# Patient Record
Sex: FEMALE | Race: WHITE | Employment: FULL TIME | URBAN - METROPOLITAN AREA
[De-identification: names, ages, dates, MRNs, and addresses within clinical notes are randomized per-mention and may not be internally consistent; named-entity substitution may affect disease eponyms.]

---

## 2023-03-03 ENCOUNTER — OFFICE VISIT (OUTPATIENT)
Dept: PRIMARY CARE CLINIC | Age: 28
End: 2023-03-03
Payer: COMMERCIAL

## 2023-03-03 VITALS
HEIGHT: 67 IN | TEMPERATURE: 97.7 F | SYSTOLIC BLOOD PRESSURE: 111 MMHG | BODY MASS INDEX: 23.1 KG/M2 | WEIGHT: 147.2 LBS | DIASTOLIC BLOOD PRESSURE: 72 MMHG | HEART RATE: 80 BPM | RESPIRATION RATE: 16 BRPM | OXYGEN SATURATION: 99 %

## 2023-03-03 DIAGNOSIS — J01.90 ACUTE BACTERIAL RHINOSINUSITIS: Primary | ICD-10-CM

## 2023-03-03 DIAGNOSIS — B96.89 ACUTE BACTERIAL RHINOSINUSITIS: Primary | ICD-10-CM

## 2023-03-03 PROCEDURE — 99213 OFFICE O/P EST LOW 20 MIN: CPT | Performed by: NURSE PRACTITIONER

## 2023-03-03 RX ORDER — AMOXICILLIN AND CLAVULANATE POTASSIUM 875; 125 MG/1; MG/1
1 TABLET, FILM COATED ORAL 2 TIMES DAILY
Qty: 20 TABLET | Refills: 0 | Status: SHIPPED | OUTPATIENT
Start: 2023-03-03 | End: 2023-03-13

## 2023-03-03 NOTE — PROGRESS NOTES
Identified pt with two pt identifiers(name and ). Reviewed record in preparation for visit and have obtained necessary documentation. Chief Complaint   Patient presents with    Sinus Pain     X2-3 days; headache/congestion/ear fullness/ear soreness/cough; home covid this morning - neg; taking ibu      Vitals:    23 1539   BP: 111/72   Pulse: 80   Resp: 16   Temp: 97.7 °F (36.5 °C)   TempSrc: Temporal   SpO2: 99%   Weight: 147 lb 3.2 oz (66.8 kg)   Height: 5' 7\" (1.702 m)   PainSc:   3   PainLoc: Head       Health Maintenance Review: Patient reminded of \"due or due soon\" health maintenance. I have asked the patient to contact his/her primary care provider (PCP) for follow-up on his/her health maintenance. Coordination of Care Questionnaire:  :   1) Have you been to an emergency room, urgent care, or hospitalized since your last visit? If yes, where when, and reason for visit? no       2. Have seen or consulted any other health care provider since your last visit? If yes, where when, and reason for visit? NO      Patient is accompanied by self I have received verbal consent from Jenn Guzman to discuss any/all medical information while they are present in the room.

## 2023-03-03 NOTE — PROGRESS NOTES
Chief Complaint   Patient presents with    Sinus Pain     X2-3 days; headache/congestion/ear fullness/ear soreness/cough; home covid this morning - neg; taking ibu     HISTORY OF PRESENT ILLNESS  Jonelle Cardoza is a 33 y.o. female. Dx with sinus infection two weeks ago and placed on amoxicillin and medrol pack, finished few days ago. She reports initially felt better but started again with nasal congestion and cough. Cough worse at night. Associated headache and fatigue. She denies fever. No known allergies. She reports negative home covid test today. Review of Systems   Constitutional:  Positive for malaise/fatigue. HENT:  Positive for congestion and sinus pain. Respiratory:  Positive for cough. Negative for shortness of breath and wheezing. Cardiovascular: Negative. Gastrointestinal: Negative. Physical Exam  Vitals and nursing note reviewed. HENT:      Right Ear: Tympanic membrane normal.      Left Ear: Tympanic membrane normal.      Nose: Congestion present. Mouth/Throat:      Pharynx: Oropharynx is clear. No oropharyngeal exudate or posterior oropharyngeal erythema. Comments: No localized LAD  Cardiovascular:      Rate and Rhythm: Normal rate. Pulmonary:      Effort: Pulmonary effort is normal.      Breath sounds: Normal breath sounds. Musculoskeletal:      Cervical back: Neck supple. Neurological:      Mental Status: She is alert. Past Medical History:   Diagnosis Date    Connective tissue disease ([address]     Depression      History reviewed. No pertinent surgical history. /72 (BP 1 Location: Left arm, BP Patient Position: Sitting)   Pulse 80   Temp 97.7 °F (36.5 °C) (Temporal)   Resp 16   Ht 5' 7\" (1.702 m)   Wt 147 lb 3.2 oz (66.8 kg)   SpO2 99%   BMI 23.05 kg/m²    ASSESSMENT and PLAN  1. Acute bacterial rhinosinusitis  -     amoxicillin-clavulanate (AUGMENTIN) 875-125 mg per tablet; Take 1 Tablet by mouth two (2) times a day for 10 days. , Normal, Disp-20 Tablet, R-0  Will cover for ABRS. Discussed with patient may be related to environmental allergies. OTC treatments include 24 hour antihistamine (such as Zyrtec, Claritin, or Xyzal) and steroid nasal spray (such as Flonase or Nasacort). At home, be aware of allergy exposures.  Follow up as needed    FITO Zeng

## 2023-03-03 NOTE — PATIENT INSTRUCTIONS
OTC treatments include 24 hour antihistamine (such as Zyrtec, Claritin, or Xyzal) and steroid nasal spray (such as Flonase or Nasacort). At home, be aware of allergy exposures.

## 2024-03-07 ENCOUNTER — NURSE TRIAGE (OUTPATIENT)
Dept: OTHER | Facility: CLINIC | Age: 29
End: 2024-03-07
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
## 2024-03-28 ENCOUNTER — HOSPITAL ENCOUNTER (OUTPATIENT)
Facility: HOSPITAL | Age: 29
Setting detail: RECURRING SERIES
Discharge: HOME | End: 2024-03-31
Payer: COMMERCIAL

## 2024-03-28 PROCEDURE — 97535 SELF CARE MNGMENT TRAINING: CPT

## 2024-03-28 PROCEDURE — 97110 THERAPEUTIC EXERCISES: CPT

## 2024-03-28 PROCEDURE — 97161 PT EVAL LOW COMPLEX 20 MIN: CPT
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
## 2024-04-04 ENCOUNTER — HOSPITAL ENCOUNTER (OUTPATIENT)
Facility: HOSPITAL | Age: 29
Setting detail: RECURRING SERIES
Discharge: HOME | End: 2024-04-07
Payer: COMMERCIAL

## 2024-04-04 PROCEDURE — 97110 THERAPEUTIC EXERCISES: CPT

## 2024-04-09 ENCOUNTER — HOSPITAL ENCOUNTER (OUTPATIENT)
Facility: HOSPITAL | Age: 29
Setting detail: RECURRING SERIES
Discharge: HOME | End: 2024-04-12
Payer: COMMERCIAL

## 2024-04-09 PROCEDURE — 97110 THERAPEUTIC EXERCISES: CPT
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
## 2024-04-11 ENCOUNTER — APPOINTMENT (OUTPATIENT)
Facility: HOSPITAL | Age: 29
End: 2024-04-11
Payer: COMMERCIAL

## 2024-04-16 ENCOUNTER — APPOINTMENT (OUTPATIENT)
Facility: HOSPITAL | Age: 29
End: 2024-04-16
Payer: COMMERCIAL

## 2024-04-18 ENCOUNTER — HOSPITAL ENCOUNTER (OUTPATIENT)
Facility: HOSPITAL | Age: 29
Setting detail: RECURRING SERIES
Discharge: HOME | End: 2024-04-21
Payer: COMMERCIAL

## 2024-04-18 PROCEDURE — 97110 THERAPEUTIC EXERCISES: CPT

## 2024-04-18 NOTE — PROGRESS NOTES
focus on some thoracic mobility and shoulder strength.  She had difficulty maintaining CKC positions for thoracic mobility activity in quadruped.  She did fatigue by the end of the session but she is able to complete all there-ex.  Will Continue for 2-3 sessions to ensure that she is back to confidently performing all work duties.  Patient will continue to benefit from skilled PT / OT services to modify and progress therapeutic interventions, analyze and address functional mobility deficits, analyze and address ROM deficits, analyze and address strength deficits, analyze and address soft tissue restrictions, and analyze and modify for postural abnormalities to address functional deficits and attain remaining goals.    Progress toward goals / Updated goals:  []  See Progress Note/Recertification    Progressing toward goals      PLAN  Yes  Continue plan of care  Re-Cert Due: NA  [x]  Upgrade activities as tolerated  []  Discharge due to:  []  Other:      Kings Khan, PT       4/18/2024       11:06 AM

## 2024-04-19 LAB
CHOLEST SERPL-MCNC: 166 MG/DL
GLUCOSE SERPL-MCNC: 88 MG/DL (ref 65–100)
HDLC SERPL-MCNC: 64 MG/DL
LDLC SERPL CALC-MCNC: 94 MG/DL (ref 0–100)
TRIGL SERPL-MCNC: 40 MG/DL

## 2024-04-23 ENCOUNTER — APPOINTMENT (OUTPATIENT)
Facility: HOSPITAL | Age: 29
End: 2024-04-23
Payer: COMMERCIAL

## 2024-04-25 ENCOUNTER — HOSPITAL ENCOUNTER (OUTPATIENT)
Facility: HOSPITAL | Age: 29
Setting detail: RECURRING SERIES
Discharge: HOME | End: 2024-04-28
Payer: COMMERCIAL

## 2024-04-25 ENCOUNTER — APPOINTMENT (OUTPATIENT)
Facility: HOSPITAL | Age: 29
End: 2024-04-25
Payer: COMMERCIAL

## 2024-04-25 PROCEDURE — 97110 THERAPEUTIC EXERCISES: CPT

## 2024-04-25 NOTE — PROGRESS NOTES
PHYSICAL THERAPY - DAILY TREATMENT NOTE (updated 3/23)      Date: 2024          Patient Name:  Shavon Moreno :  1995   Medical   Diagnosis:  Left wrist pain [M25.532]  Right wrist pain [M25.531]  Cervicalgia [M54.2]  Other low back pain [M54.59] Treatment Diagnosis:  M25.531  RIGHT WRIST PAIN    Referral Source:  Lorne Whitehead, * Insurance:   Payor: [employer] / Plan: [employer] / Product Type: *No Product type* /                     Patient  verified yes     Visit #   Current  / Total 5 24   Time   In / Out 834 912   Total Treatment Time 38   Total Timed Codes 38         SUBJECTIVE    Pain Level (0-10 scale): 1    Any medication changes, allergies to medications, adverse drug reactions, diagnosis change, or new procedure performed?: [x] No    [] Yes (see summary sheet for update)  Medications: Verified on Patient Summary List    Subjective functional status/changes:     Pt is doing well had a small flare up with moving furniture over last weekend.  States that it was awkward furniture and had to have the wrist in hand in an awkward position.      OBJECTIVE      Therapeutic Procedures:  Tx Min Billable or 1:1 Min (if diff from Tx Min) Procedure, Rationale, Specifics   38  75497 Therapeutic Exercise (timed):  increase ROM, strength, coordination, balance, and proprioception to improve patient's ability to progress to PLOF and address remaining functional goals. (see flow sheet as applicable)     Details if applicable:                         38     Total Total           [x]  Patient Education billed concurrently with other procedures   [x] Review HEP    [] Progressed/Changed HEP, detail:    [] Other detail:         Other Objective/Functional Measures  NA    Pain Level at end of session (0-10 scale): 0      Assessment   Pt continues to progress and gain strength.  She is improving thoracic mobility as well.  Minor discomfort remains with certain positions.  Will continue for

## 2024-05-03 ENCOUNTER — HOSPITAL ENCOUNTER (OUTPATIENT)
Facility: HOSPITAL | Age: 29
Setting detail: RECURRING SERIES
Discharge: HOME | End: 2024-05-06
Payer: COMMERCIAL

## 2024-05-03 PROCEDURE — 97110 THERAPEUTIC EXERCISES: CPT

## 2024-05-03 NOTE — PROGRESS NOTES
PHYSICAL THERAPY - DAILY TREATMENT NOTE (updated 3/23)      Date: 5/3/2024          Patient Name:  Shavon Moreno :  1995   Medical   Diagnosis:  Left wrist pain [M25.532]  Right wrist pain [M25.531]  Cervicalgia [M54.2]  Other low back pain [M54.59] Treatment Diagnosis:  M25.531  RIGHT WRIST PAIN    Referral Source:  Lorne Whitehead, * Insurance:   Payor: [facility] / Plan: [employer] / Product Type: *No Product type* /                     Patient  verified yes     Visit #   Current  / Total 6 24   Time   In / Out 1222 1253   Total Treatment Time 31   Total Timed Codes 31         SUBJECTIVE    Pain Level (0-10 scale): 0    Any medication changes, allergies to medications, adverse drug reactions, diagnosis change, or new procedure performed?: [x] No    [] Yes (see summary sheet for update)  Medications: Verified on Patient Summary List    Subjective functional status/changes:     Pt reports that she is doing well and having no limitations.  Has returned to all full duty resposibility and is completing yardwork.  Will be returning to all gym activity soon       OBJECTIVE      Therapeutic Procedures:  Tx Min Billable or 1:1 Min (if diff from Tx Min) Procedure, Rationale, Specifics   31  35559 Therapeutic Exercise (timed):  increase ROM, strength, coordination, balance, and proprioception to improve patient's ability to progress to PLOF and address remaining functional goals. (see flow sheet as applicable)     Details if applicable:                         31     Total Total           [x]  Patient Education billed concurrently with other procedures   [x] Review HEP    [] Progressed/Changed HEP, detail:    [] Other detail:         Other Objective/Functional Measures  rip Strength: R = 100lbs.; 102lbs. Average = 101 lbs.                            L = 105 lbs.; 98 lbs. Average = 101.5lbs.  Pincer : R = 17.5lbs, Left 18lbs    Pain Level at end of session (0-10 scale):

## 2024-05-03 NOTE — PROGRESS NOTES
[facility]  [address]  [place] [address]  Phone: [phone]   Fax: [phone]     DISCHARGE SUMMARY  Patient Name: Shavon Moreno : 1995   Treatment/Medical Diagnosis: Left wrist pain [M25.532]  Right wrist pain [M25.531]  Cervicalgia [M54.2]  Other low back pain [M54.59]   Referral Source: Lorne Whitehead, *     Date of Initial Visit: 3/28/24 Attended Visits: 6 Missed Visits: 0     SUMMARY OF TREATMENT  Pt has completed 6 sessions of therapy focused on restoring strength and ROM to the right wrist.  She has progressed well and has returned to all PLOF including full duty at work.      CURRENT STATUS   Strength: R = 100lbs.; 102lbs. Average = 101 lbs.                            L = 105 lbs.; 98 lbs. Average = 101.5lbs.  Pincer : R = 17.5lbs, Left 18lbs      Short Term Goals: To be accomplished in 3 treatments.  Pt will self-report compliance with HEP to demonstrate self-management of symptoms outside of formal PT sessions MET     Pt will self-report </=5/10 worst pain in R wrist for >/=3/7 days of the week for improvements in overall quality of life MET     Long Term Goals: To be accomplished in 6 treatments.  Pt will self-report </=1/10 pain in worst pain in R wrist for >/=5/7 days of the week for overall improvements in quality of life MET     Pt will report ability to type and write without onset of R wrist pain/discomfort MET     Pt will improve FOTO score to >/=78 to demonstrated self-reported improvements in functional status MET    RECOMMENDATIONS  Discontinue therapy. Progressing towards or have reached established goals.        Kings Khan, PT       5/3/2024       12:59 PM    If you have any questions/comments please contact us directly at [phone].   Thank you for allowing us to assist in the care of your patient.

## 2024-10-15 ENCOUNTER — NURSE TRIAGE (OUTPATIENT)
Dept: OTHER | Facility: CLINIC | Age: 29
End: 2024-10-15

## 2024-10-15 NOTE — TELEPHONE ENCOUNTER
Location of patient: [place]    Subjective: Caller states \"Pt has been having nausea and vomiting since Thursday. Pt was seen by pt first on Sunday\"     Current Symptoms: nausea, vomiting, hot flashes, lack of appetite    Onset: 5 days ago;     Pain Severity: 0/10; N/A;     Temperature: denies       Recommended disposition: See PCP within 3 Days    Care advice provided, patient verbalizes understanding; denies any other questions or concerns; instructed to call back for any new or worsening symptoms.    Pt agrees to be seen by a provider. If symptoms continue or worsen she will be seen in the ER. Pt currently has no farther questions or concerns at this time.    Attention Provider:  Thank you for allowing me to participate in the care of your patient.  The patient was connected to triage in response to symptoms provided.   Please do not respond through this encounter as the response is not directed to a shared pool.      Reason for Disposition   Nausea lasts > 1 week    Protocols used: Nausea-ADULT-